# Patient Record
(demographics unavailable — no encounter records)

---

## 2017-04-15 NOTE — UCPHY
H & P


Time Seen by Provider: 04/15/17 14:29


Patient Type: New


HPI/ROS: 





Chief complaint.  Smashed finger





HPI.  Patient is a 68-year-old female who presents about 45 minutes after 

helping her  put a vacuum  into a box.  The  had the 

vacuum  elevate to slide down into the box.  The patient was holding the 

rim of the box with both hands and the vacuum  came down and smashed her 

left hand.  Initially her pain was isolated to the tip of the left 3rd finger 

but now seems to have spread to bother most of the fingers.  She is placed her 

hand in ice.  Patient is right handed.  No other injuries





ROS


Constitutional.  no fever/chills, no weakness


Eyes.  no problems with vision


ENT.  no sore throat, no nasal drainage


Cardiovascular.  no chest pain


Respiratory.  no shortness of breath, no cough


Abdominal.  no abdominal pain, no nausea/vomiting, no diarrhea


.  no problems urinating


MS.  Injury to fingers of the left hand especially the left 3rd finger


Skin.  no rash


Lymph.  no swollen glands


Neuro.  no headache, no dizziness, no difficulty walking or with speech


Past Medical/Surgical History: 





Healthy


Social History: 





, nonsmoker, no alcohol


Physical Exam: 





General Appearance:  Alert well-developed female moderate distress vital signs 

are stable


Eyes: Pupils equal and round no pallor or injection.


ENT, Mouth:  Mucous membranes are moist.


Respiratory:  There are no retractions, lungs are clear to auscultation.


Cardiovascular: Regular rate and rhythm.


Gastrointestinal:   Abdomen is soft and nontender, no masses, bowel sounds 

normal.


Neurological:  Awake and alert, sensory and motor exams grossly normal.


Skin: Warm and dry, no rashes.


Musculoskeletal:  Neck is supple nontender.


Extremities fingers of the left hand are examined and so no obvious swelling or 

deformity.  The skin somewhat erythematous dull fingers as she has had an ice.  

She has fingernail Polish on but there is no evidence of subungual hematoma.  

She has good movement.  Distal motor vascular sensitivity is intact


Psychiatric: Patient is oriented X 3, there is no agitation.


Constitutional: 


 Initial Vital Signs











Temperature (C)  36.5 C   04/15/17 14:18


 


Heart Rate  68   04/15/17 14:18


 


Respiratory Rate  16   04/15/17 14:18


 


Blood Pressure  146/84 H  04/15/17 14:18


 


O2 Sat (%)  97   04/15/17 14:18








 











O2 Delivery Mode               Room Air














Allergies/Adverse Reactions: 


 





alendronate sodium [From Fosamax] Allergy (Verified 04/15/17 14:25)


 


codeine Allergy (Verified 04/15/17 14:25)


 








Home Medications: 














 Medication  Instructions  Recorded


 


Herbals/Supplements -Info Only  04/15/17


 


Hydrocodone/APAP 5/325 [Norco 1 each PO Q4-6PRN PRN #14 tab 04/15/17





5/325 (*)]  














MDM/Departure





- MDM


Diagnostics: 


X-ray fingers left hand interpreted by me is negative for fracture dislocation


Procedures: 





Patient is placed in aluminum foam splint left 3rd finger for protection and 

pain control.  Post splint application inspected by me shows good anatomic 

position and distal motor vascular sensitivity to be intact


Medications Given: 


 








Discontinued Medications





Hydrocodone Bitart/Acetaminophen (Norco 5/325)  1 tab PO EDNOW ONE


   Stop: 04/15/17 14:37


   Last Admin: 04/15/17 14:42 Dose:  1 tab


Ibuprofen (Motrin)  600 mg PO EDNOW ONE


   Stop: 04/15/17 14:32


   Last Admin: 04/15/17 14:36 Dose:  600 mg


Patient is also given hydrocodone


ED Course/Re-evaluation: 





On reexamination patient is feeling better.  Stable.  The patient, her , 

and I discussed imaging study results, treatment plan including criteria for 

return importance of follow-up further evaluation.  She expresses understanding 

and agreement


Differential Diagnosis: 





I considered fracture, dislocation, contusion





- Depart


Disposition: Home, Routine, Self-Care


Condition: Good


Instructions:  Contusion in Adults (ED)


Additional Instructions: 


Continue ice off and on the next 24 hours.  Ibuprofen 600 mg every 6 hours for 

discomfort.  Hydrocodone in addition for discomfort.  Splint on for 2-3 days 

for comfort.





  Return for worsening symptoms. Recheck 2-3 days if not improving


Prescriptions: 


Hydrocodone/APAP 5/325 [Norco 5/325 (*)] 1 each PO Q4-6PRN PRN #14 tab


 PRN Reason: Pain, Moderate


Referrals: 


Bren Toledo, DO [Primary Care Provider] - 2-3 days, if not improved





- PQRS


PQRS Measurement: 








134:   Depression screening and followup, PRIME MD-PHQ2  (12 years and older)


Over the last 2 weeks, how often have you been bothered by any of the following 

problems? 


 1. Feeling down, depressed, or hopeless?


2. Little interest or pleasure in doing things? 


Patient answered no to both 1 and 2








130:  Documentation of medications.  


Reviewed all patient medications, doses, route and frequency.








226:  Do you smoke?





No.





47:  65 and older: Advanced care planning.





Patient has advanced directive.